# Patient Record
Sex: FEMALE | Race: WHITE | Employment: FULL TIME | URBAN - METROPOLITAN AREA
[De-identification: names, ages, dates, MRNs, and addresses within clinical notes are randomized per-mention and may not be internally consistent; named-entity substitution may affect disease eponyms.]

---

## 2017-01-18 PROBLEM — F33.1 MODERATE EPISODE OF RECURRENT MAJOR DEPRESSIVE DISORDER (HCC): Status: ACTIVE | Noted: 2017-01-18

## 2017-01-19 PROBLEM — E78.2 MIXED HYPERLIPIDEMIA: Status: ACTIVE | Noted: 2017-01-19

## 2018-03-28 ENCOUNTER — PATIENT OUTREACH (OUTPATIENT)
Dept: OTHER | Age: 28
End: 2018-03-28

## 2018-04-18 ENCOUNTER — PATIENT OUTREACH (OUTPATIENT)
Dept: OTHER | Age: 28
End: 2018-04-18

## 2018-04-18 NOTE — PROGRESS NOTES
CCM Outreach. Patient identified as eligible for [address] [place] [address] services. Second telephone outreach attempted. Left discreet voicemail with this CM confidential contact information. * ED visits/  FU apt with surgeon 04/02 and planned OP surgery tomorrow. * Will Follow for DEJUAN needs post-op. FU after discharge/ possibly Friday 4/20. Chart Review:    Plan:   Laparoscopic possible open cholecystectomy.

## 2018-04-19 PROBLEM — K80.10 CALCULUS OF GALLBLADDER WITH CHRONIC CHOLECYSTITIS WITHOUT OBSTRUCTION: Status: ACTIVE | Noted: 2018-04-19

## 2018-04-20 ENCOUNTER — PATIENT OUTREACH (OUTPATIENT)
Dept: OTHER | Age: 28
End: 2018-04-20

## 2018-04-20 NOTE — PROGRESS NOTES
DEJUAN Call:      Patient on report as with discharge from hospital OP lap la nena 4/19. Initial attempt to contact patient for transitions of care. Left discreet message on voicemail with this CM contact information. * Attempts to offer CCM - with no response from patient. Will attempt to contact again. Chart Review:   OP lap la nena 4/19    Follow-up Information     Follow up With Details Comments Contact Info     [facility], [address] [address] [facility] [address] [phone]     Zhanna Sena MD Follow up in 2 week(s) Follow up with Dr. Ramila Mathew on May 4, 2018 at 11:00 a.m. [address] [facility] [address]  [phone]      Your Scheduled Appointments            Friday May 04, 2018 11:00 AM EDT   New Patient with Zhanna Sena MD   [facility] ([facility])     Dilmamooseas Solis [address] [phone]                  Wednesday May 09, 2018 10:15 AM EDT   Follow Up with Sulaiman Anna NP   [facility])     [address] [place] [address]   [phone]                    Discharge Orders           None               A check winnie indicates which time of day the medication should be taken.            My Medications       START taking these medications       Instructions Each Dose to Equal   Morning Noon Evening Bedtime     ondansetron 4 mg disintegrating tablet   Commonly known as:  ZOFRAN ODT        Your last dose was:          Your next dose is:                 Take 1 Tab by mouth every eight (8) hours as needed for Nausea.     4 mg                                   oxyCODONE-acetaminophen 5-325 mg per tablet   Commonly known as:  PERCOCET        Your last dose was:          Your next dose is:                 Take 1-2 Tabs by mouth every four (4) hours as needed for Pain.  Max Daily Amount: 12 Tabs.     1-2 Tab                                   PATIENT INSTRUCTIONS:     After general anesthesia or intravenous sedation, for 24 hours or while taking prescription Narcotics:  · Limit your activities  · Do not drive and operate hazardous machinery  · Do not make important personal or business decisions  · Do  not drink alcoholic beverages  · If you have not urinated within 8 hours after discharge, please contact your surgeon on call.     Report the following to your surgeon:  · Excessive pain, swelling, redness or odor of or around the surgical area  · Temperature over 100.5  · Nausea and vomiting lasting longer than 4 hours or if unable to take medications  · Any signs of decreased circulation or nerve impairment to extremity: change in color, persistent  numbness, tingling, coldness or increase pain  · Any questions     What to do at Home:     Recommended activity: Bedrest today and increase activity as tolerated.  No driving for today and while taking pain narcotics.     OK to resume diet, but start with light foods today and advance as tolerated.     Keep the follow up appointment we have scheduled for you with Dr. Dania Vasquez, but if you would have any problems before then, notify Dr. Dania Vasquez and/ or return to the ER as tolerated.     Further instructions we have reviewed with you regarding what to expect after your procedure and how to better care for yourself at home are being included with your discharge instructions.

## 2018-04-23 ENCOUNTER — PATIENT OUTREACH (OUTPATIENT)
Dept: OTHER | Age: 28
End: 2018-04-23

## 2018-04-23 NOTE — PROGRESS NOTES
DEJUAN 2nd call:      Patient on report as with discharge from hospital OP genevieve deutsch 4/19. Second attempt to contact patient for transitions of care. Left discreet message on voicemail with this CM contact information. Unable to reach letter sent via My Chart/  Mail    * Also unresponsive to calls to offer CCM. Will follow for one month for transitions of care needs.

## 2018-04-23 NOTE — LETTER
4/23/2018 1:25 PM 
 
Ms. Dexter Rios [address] [facility] [address] Dear Ms. Seth Thu, My name is Barbra Tenorio , Employee Care Manager for [employer], and I have been trying to reach you. The Employee Care  is a free-of-charge, confidential service provided to our employees and their family members covered by the [employer]. Part of my job is to follow up with members who have recently been in the hospital or emergency room, to help them coordinate their care and answer questions they may have about their visit. I am able to provide assistance with medication questions, scheduling needed follow-up appointments, and arranging services like home health or home medical equipment. I can also provide education regarding your hospital or ER visit as well as your medical conditions. As healthcare providers, we know that patients do better when they have close follow up with a primary care provider (PCP), especially after a hospital or emergency department visit. If you do not have a PCP, I can help you find one that is convenient to you and covered by your insurance. I can also help you understand any after visit instructions, such as what symptoms to watch out for, or any new or changed medications. Remember that you can access your After Visit Summary by logging into your [employer] account. If you do not have a [employer] account, I can help you request access. Our program is designed to provide you with the opportunity to have a [employer] care manager partner with you for your healthcare needs. Please contact me at the below number if I can provide you with assistance for any of the above services.  
 
 
Sincerely, 
 
 
 
 
 
 
 
QUYNH Kulkarni RN, [place] [address], [address] [address].  
Phone:  [phone]     Fax:  [phone] [email or web address]

## 2018-05-21 ENCOUNTER — PATIENT OUTREACH (OUTPATIENT)
Dept: OTHER | Age: 28
End: 2018-05-21

## 2018-05-21 NOTE — LETTER
5/21/2018 3:37 PM 
 
Ms. Burnham Valencia [address] [facility] [address] Dear Ms. Marquise Huggins, My name is Mamie Lowe, Employee Care Manager for [facility] and I have been trying to reach you. The Employee Care Management [facility]) program is a free-of-charge confidential service provided to our employees and their family members covered by the [facility]. The program will provide an employee and his/her family with the [facility] expertise to assist in navigating the health care delivery system, provider services, and their overall care needsso as to assure and improve health care interactions and enhance the quality of life. This program is designed to provide you with the opportunity to have a [facility] care manager partner with you for the following services: 
 
 1) when you come home from the hospital or emergency room 2) when help is needed to manage your disease 3) when you need assistance coordinating services or appointments [employer] now partners with [facility]. If you are a qualifying employee, you may receive an additional 10 wellness incentive points for every month of active participation with an Employee Care Manager. [facility] is dedicated to empowering the good health of its community and improving the quality of care and care experiences for employees and their families. We are committed to safeguarding patient confidentiality and privacy, assuring that every employee has the respect he or she deserves in managing their health. The information shared with your care manager will not be shared with anyone else aside from those you identify as part of your care team, and will only be used to assist you with any identified care needs. Please contact me if you would like this service provided to you.   
 
Sincerely, 
 
 
 
 
 
Mamie Lowe, RN 
[place], [address] [address]  
 Phone:  [phone]     Fax:  [phone]    [email or web address]

## 2018-05-21 NOTE — PROGRESS NOTES
Closing DEJUAN/ CCM. Unable to reach patient    DEJUAN  Wrap Up  Resolving current episode (Transitions of care complete). No further ED/UC or hospital admissions within 30 days post discharge. No contact with this patient to confirm any follow up appointment. None found in Connect care. Final attempt to contact patient for transitions of care. Left discreet message on voicemail with this CM contact information. Three phone attempts and a letter sent encouraging contact with CM. No outreach from patient to [address] [place] [address]. Final CCM note  Resolving current episode for case management due to patient unable to reach. Patient has not been reached after repeated calls and letters. Letter sent to patient notifying completion of services due to unable to reach. This writer's contact information and information regarding program services included in materials sent.

## 2019-01-09 PROBLEM — E66.01 OBESITY, MORBID (HCC): Status: ACTIVE | Noted: 2019-01-09

## 2019-04-15 PROBLEM — K80.10 CALCULUS OF GALLBLADDER WITH CHRONIC CHOLECYSTITIS WITHOUT OBSTRUCTION: Status: RESOLVED | Noted: 2018-04-19 | Resolved: 2019-04-15

## 2019-07-23 PROBLEM — E28.2 PCOS (POLYCYSTIC OVARIAN SYNDROME): Status: ACTIVE | Noted: 2019-07-23

## 2019-12-05 ENCOUNTER — PATIENT OUTREACH (OUTPATIENT)
Dept: OTHER | Age: 29
End: 2019-12-05

## 2019-12-05 NOTE — PROGRESS NOTES
Patient on report as eligible for Case Management. Pt was admitted to [facility] 12/2/19 and discharged 12/3/19 for scheduled procedure. Procedures    Surgical Case Request: GASTRECTOMY, SLEEVE, LAPAROSCOPIC, HERNIA, LAPAROSCOPIC, HIATAL        Left discreet message on voicemail with this CM contact information. Will attempt to contact again to offer [address] services.

## 2019-12-06 ENCOUNTER — PATIENT OUTREACH (OUTPATIENT)
Dept: OTHER | Age: 29
End: 2019-12-06

## 2019-12-06 NOTE — LETTER
12/6/2019 11:49 AM 
Ms. Yenifer Mancini [address] [facility] [address] Dear Ms. Jose Galvin, My name is Lois, Employee Care Manager for [facility], and I have been trying to reach you. The Employee Care  is a free-of-charge, confidential service provided to our employees and their family members covered by the [employer]. Part of my job is to follow up with members who have recently been in the hospital or emergency room, to help them coordinate their care and answer questions they may have about their visit. I am able to provide assistance with medication questions, scheduling needed follow-up appointments, and arranging services like home health or home medical equipment. I can also provide education regarding your hospital or ER visit as well as your medical conditions. As healthcare providers, we know that patients do better when they have close follow up with a primary care provider (PCP), especially after a hospital or emergency department visit. If you do not have a PCP, I can help you find one that is convenient to you and covered by your insurance. I can also help you understand any after visit instructions, such as what symptoms to watch out for, or any new or changed medications. Remember that you can access your After Visit Summary by logging into your [employer] account. If you do not have a [employer] account, I can help you request access. Our program is designed to provide you with the opportunity to have a [facility] care manager partner with you for your healthcare needs. Please contact me at the below number if I can provide you with assistance for any of the above services. Sincerely, Deana Arora RN, [address] [place] [address]. [address] [place] [address].  
Cell [phone] Fax [email or web address]

## 2019-12-06 NOTE — PROGRESS NOTES
Second attempt to reach patient for [facility], and discharge assessment. Discreet VM left. Will send UTR letter.      Cart review:  Pt was admitted to [facility] 12/2/19 and discharged 12/3/19 for scheduled procedure.     Procedures    Surgical Case Request: GASTRECTOMY, SLEEVE, LAPAROSCOPIC, HERNIA, LAPAROSCOPIC, HIATAL

## 2020-01-06 ENCOUNTER — PATIENT OUTREACH (OUTPATIENT)
Dept: OTHER | Age: 30
End: 2020-01-06

## 2020-01-06 NOTE — PROGRESS NOTES
DEJUAN Follow Up. Covering for CM Diony Barron RN                                                                                          Introduced myself as covering for [address] [place] [address]. Telephone attempt to contact patient for transitions of care. Left discreet message on voicemail with this CC contact information. Will inform CM was unable to reach.

## 2020-01-28 ENCOUNTER — PATIENT OUTREACH (OUTPATIENT)
Dept: OTHER | Age: 30
End: 2020-01-28

## 2020-01-28 NOTE — PROGRESS NOTES
Resolving current episode DEJUAN (Transitions of care complete). No further ED/UC or hospital admissions within 30 days post discharge. Unable to verify if patient attended follow-up appointments as directed, never returned call. No outreach from patient to [address] [place] [address].